# Patient Record
Sex: FEMALE | Race: WHITE | URBAN - METROPOLITAN AREA
[De-identification: names, ages, dates, MRNs, and addresses within clinical notes are randomized per-mention and may not be internally consistent; named-entity substitution may affect disease eponyms.]

---

## 2022-07-12 ENCOUNTER — OFFICE VISIT (OUTPATIENT)
Dept: URBAN - METROPOLITAN AREA CLINIC 17 | Facility: CLINIC | Age: 52
End: 2022-07-12
Payer: COMMERCIAL

## 2022-07-12 DIAGNOSIS — H34.8312 TRIBUTARY BRANCH RETINAL VEIN OCCLUSION OF RIGHT EYE, STABLE: ICD-10-CM

## 2022-07-12 DIAGNOSIS — H35.031 HYPERTENSIVE RETINOPATHY, RIGHT EYE: Primary | ICD-10-CM

## 2022-07-12 PROCEDURE — 92004 COMPRE OPH EXAM NEW PT 1/>: CPT | Performed by: OPHTHALMOLOGY

## 2022-07-12 PROCEDURE — 92134 CPTRZ OPH DX IMG PST SGM RTA: CPT | Performed by: OPHTHALMOLOGY

## 2022-07-12 ASSESSMENT — INTRAOCULAR PRESSURE
OS: 12
OD: 13

## 2022-07-12 NOTE — IMPRESSION/PLAN
Impression: Hypertensive retinopathy, right eye: H35.031. Right. Condition: established, stable. Vision: vision not affected. Plan: Discussed diagnosis in detail with patient. Exam and [facility] shows a nasal vascular occlusion. OCT shows no edema. Advised patient to keep her blood pressure under control. No treatment is recommended at this time. Emphasized blood sugar control and advised to keep future appointments with PCP and/or management of her blood pressure. Recommend observation for now. Pt may continue eye care w/ [facility].

## 2022-07-12 NOTE — IMPRESSION/PLAN
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 Plan: Discussed diagnosis in detail with patient. Exam and Optos shows a nasal vascular occlusion, no hemorrhage and no edema. OCT shows no edema and no scar tissue. No treatment is recommended at this time. Emphasized blood sugar control and advised to keep future appointments with PCP for the management of her blood pressure.   
Pt may continue eye care w/ [facility],